# Patient Record
(demographics unavailable — no encounter records)

---

## 2025-07-17 NOTE — ADDENDUM
[FreeTextEntry1] : A portion of this note was written by [Surjit Torres] on 07/15/2025 acting as a scribe for Dr. Kahn.   I have personally reviewed the chart and agree that the record accurately reflects my personal performance of the history, physical exam, assessment, and plan.

## 2025-07-17 NOTE — ASSESSMENT
[FreeTextEntry1] : I discussed the findings and options with Ms. SHEA SMITH in detail.   Options for management of the patient's overactive bladder and stress incontinence discussed at length. These included medical therapy, behavioral modification, bladder retraining, and surgical options. Surgical options for third line therapies reviewed included injection of botulinum toxin versus sacral nerve stimulation therapy.  A long discussion was held regarding the options for her predominantly stress urinary incontinence, including pelvic floor physical therapy, placement of a urethral sling, and urethral bulking agents (e.g. Bulkamid). The various procedures were described in detail with their risks and benefits. She understands that "do nothing" is an option.  Given her mixed urinary incontinence, we discussed urodynamics in great detail to obtain more objective functional information on her bladder.  - pt reluctant to start OAB medications with current weight loss and blood pressure medications- discussed PFPT and botox.  Seems most bothered by the urge rather than stress component.  - UA/UC   Return in office for urodynamics. Patient expressed understanding.     The total amount of time I have personally spent preparing for this visit, reviewing the patient's test results, obtaining external history, ordering tests/medications, documenting clinical information, communicating with and counseling the patient/family and/or caregiver(s), reviewing old records, and spent face to face with the patient explaining the above was 45 minutes.

## 2025-07-17 NOTE — HISTORY OF PRESENT ILLNESS
[FreeTextEntry1] : 07/15/2025 -- 46 F presenting with mixed urinary incontinence UUI>JAYME.   Reports urge incontinence with small amounts of leakage, using poise thin pads for protection. Reports urinary frequency. Admits double voiding. Nocturia 2-3. Reports onset JAYME 9 years ago following vaginal delivery.  She has not had any recent UTIs. Denies gross hematuria or dysuria.   Not sexually active.  . Denies sensation of prolapse.  Patient on hormonal IUD with intermittent spotting, no perimenopausal symptoms reported, denies hot flashes.   PVR: 7cc (done to rule out incomplete bladder emptying).  Udip: 07/15/2025 --- negative   PMH: HTN  PSH: denies  FH: no known  malignancies, unlinked CaP, father  SocHx: non smoker, social alcohol  Meds:  Amlodipine, IUD, Allergies: NKDA

## 2025-07-17 NOTE — HISTORY OF PRESENT ILLNESS
[FreeTextEntry1] : 07/15/2025 -- 46 F presenting with mixed urinary incontinence UUI>JAYEM.   Reports urge incontinence with small amounts of leakage, using poise thin pads for protection. Reports urinary frequency. Admits double voiding. Nocturia 2-3. Reports onset JAYME 9 years ago following vaginal delivery.  She has not had any recent UTIs. Denies gross hematuria or dysuria.   Not sexually active.  . Denies sensation of prolapse.  Patient on hormonal IUD with intermittent spotting, no perimenopausal symptoms reported, denies hot flashes.   PVR: 7cc (done to rule out incomplete bladder emptying).  Udip: 07/15/2025 --- negative   PMH: HTN  PSH: denies  FH: no known  malignancies, unlinked CaP, father  SocHx: non smoker, social alcohol  Meds:  Amlodipine, IUD, Allergies: NKDA